# Patient Record
Sex: FEMALE | Race: WHITE | NOT HISPANIC OR LATINO | Employment: UNEMPLOYED | URBAN - METROPOLITAN AREA
[De-identification: names, ages, dates, MRNs, and addresses within clinical notes are randomized per-mention and may not be internally consistent; named-entity substitution may affect disease eponyms.]

---

## 2023-04-02 ENCOUNTER — APPOINTMENT (OUTPATIENT)
Dept: GENERAL RADIOLOGY | Facility: HOSPITAL | Age: 68
End: 2023-04-02
Payer: MEDICARE

## 2023-04-02 LAB
QT INTERVAL: 376 MS
QTC INTERVAL: 423 MS

## 2023-04-02 PROCEDURE — 93005 ELECTROCARDIOGRAM TRACING: CPT

## 2023-04-02 PROCEDURE — 36415 COLL VENOUS BLD VENIPUNCTURE: CPT

## 2023-04-02 PROCEDURE — 85025 COMPLETE CBC W/AUTO DIFF WBC: CPT | Performed by: EMERGENCY MEDICINE

## 2023-04-02 PROCEDURE — 85379 FIBRIN DEGRADATION QUANT: CPT | Performed by: INTERNAL MEDICINE

## 2023-04-02 PROCEDURE — 99284 EMERGENCY DEPT VISIT MOD MDM: CPT

## 2023-04-02 PROCEDURE — 84484 ASSAY OF TROPONIN QUANT: CPT | Performed by: EMERGENCY MEDICINE

## 2023-04-02 PROCEDURE — 83036 HEMOGLOBIN GLYCOSYLATED A1C: CPT | Performed by: INTERNAL MEDICINE

## 2023-04-02 PROCEDURE — 80053 COMPREHEN METABOLIC PANEL: CPT | Performed by: EMERGENCY MEDICINE

## 2023-04-02 PROCEDURE — 93005 ELECTROCARDIOGRAM TRACING: CPT | Performed by: EMERGENCY MEDICINE

## 2023-04-02 PROCEDURE — 83880 ASSAY OF NATRIURETIC PEPTIDE: CPT | Performed by: EMERGENCY MEDICINE

## 2023-04-02 PROCEDURE — 83690 ASSAY OF LIPASE: CPT | Performed by: EMERGENCY MEDICINE

## 2023-04-02 RX ORDER — ASPIRIN 81 MG/1
324 TABLET, CHEWABLE ORAL ONCE
Status: COMPLETED | OUTPATIENT
Start: 2023-04-02 | End: 2023-04-03

## 2023-04-02 RX ORDER — SODIUM CHLORIDE 0.9 % (FLUSH) 0.9 %
10 SYRINGE (ML) INJECTION AS NEEDED
Status: DISCONTINUED | OUTPATIENT
Start: 2023-04-02 | End: 2023-04-04 | Stop reason: HOSPADM

## 2023-04-03 ENCOUNTER — APPOINTMENT (OUTPATIENT)
Dept: GENERAL RADIOLOGY | Facility: HOSPITAL | Age: 68
End: 2023-04-03
Payer: MEDICARE

## 2023-04-03 ENCOUNTER — APPOINTMENT (OUTPATIENT)
Dept: CARDIOLOGY | Facility: HOSPITAL | Age: 68
End: 2023-04-03
Payer: MEDICARE

## 2023-04-03 ENCOUNTER — HOSPITAL ENCOUNTER (OUTPATIENT)
Facility: HOSPITAL | Age: 68
Setting detail: OBSERVATION
Discharge: HOME | End: 2023-04-04
Attending: EMERGENCY MEDICINE | Admitting: INTERNAL MEDICINE
Payer: MEDICARE

## 2023-04-03 DIAGNOSIS — I20.8 ANGINA AT REST: ICD-10-CM

## 2023-04-03 DIAGNOSIS — R07.9 CHEST PAIN, UNSPECIFIED TYPE: Primary | ICD-10-CM

## 2023-04-03 PROBLEM — Z82.49 FAMILY HISTORY OF ISCHEMIC HEART DISEASE BEFORE AGE 50: Status: ACTIVE | Noted: 2023-04-03

## 2023-04-03 PROBLEM — E11.9 TYPE 2 DIABETES MELLITUS, WITHOUT LONG-TERM CURRENT USE OF INSULIN: Status: ACTIVE | Noted: 2023-04-03

## 2023-04-03 PROBLEM — G20.A1 PARKINSON DISEASE: Status: ACTIVE | Noted: 2023-04-03

## 2023-04-03 PROBLEM — R55 PRE-SYNCOPE: Status: ACTIVE | Noted: 2023-04-03

## 2023-04-03 PROBLEM — G20 PARKINSON DISEASE: Status: ACTIVE | Noted: 2023-04-03

## 2023-04-03 PROBLEM — R07.89 CHEST PAIN, ATYPICAL: Status: ACTIVE | Noted: 2023-04-03

## 2023-04-03 PROBLEM — R73.03 PREDIABETES: Status: ACTIVE | Noted: 2023-04-03

## 2023-04-03 PROBLEM — E78.2 MIXED HYPERLIPIDEMIA: Status: ACTIVE | Noted: 2023-04-03

## 2023-04-03 LAB
ALBUMIN SERPL-MCNC: 4.6 G/DL (ref 3.5–5.2)
ALBUMIN/GLOB SERPL: 1.7 G/DL
ALP SERPL-CCNC: 49 U/L (ref 39–117)
ALT SERPL W P-5'-P-CCNC: 6 U/L (ref 1–33)
ANION GAP SERPL CALCULATED.3IONS-SCNC: 12 MMOL/L (ref 5–15)
ASCENDING AORTA: 3.3 CM
AST SERPL-CCNC: 18 U/L (ref 1–32)
BASOPHILS # BLD AUTO: 0.06 10*3/MM3 (ref 0–0.2)
BASOPHILS NFR BLD AUTO: 0.6 % (ref 0–1.5)
BH CV ECHO MEAS - AO MAX PG: 7.4 MMHG
BH CV ECHO MEAS - AO MEAN PG: 4 MMHG
BH CV ECHO MEAS - AO ROOT DIAM: 3 CM
BH CV ECHO MEAS - AO V2 MAX: 136 CM/SEC
BH CV ECHO MEAS - AO V2 VTI: 29.7 CM
BH CV ECHO MEAS - AVA(I,D): 2.09 CM2
BH CV ECHO MEAS - EDV(CUBED): 40.9 ML
BH CV ECHO MEAS - EDV(MOD-SP2): 85.5 ML
BH CV ECHO MEAS - EDV(MOD-SP4): 77.5 ML
BH CV ECHO MEAS - EF(MOD-BP): 66.3 %
BH CV ECHO MEAS - EF(MOD-SP2): 71.6 %
BH CV ECHO MEAS - EF(MOD-SP4): 59.6 %
BH CV ECHO MEAS - ESV(CUBED): 12.1 ML
BH CV ECHO MEAS - ESV(MOD-SP2): 24.3 ML
BH CV ECHO MEAS - ESV(MOD-SP4): 31.3 ML
BH CV ECHO MEAS - FS: 33.3 %
BH CV ECHO MEAS - IVS/LVPW: 0.78 CM
BH CV ECHO MEAS - IVSD: 0.77 CM
BH CV ECHO MEAS - LA DIMENSION: 2.8 CM
BH CV ECHO MEAS - LAT PEAK E' VEL: 10.2 CM/SEC
BH CV ECHO MEAS - LV MASS(C)D: 83.3 GRAMS
BH CV ECHO MEAS - LV MAX PG: 4.2 MMHG
BH CV ECHO MEAS - LV MEAN PG: 2 MMHG
BH CV ECHO MEAS - LV V1 MAX: 102 CM/SEC
BH CV ECHO MEAS - LV V1 VTI: 22.5 CM
BH CV ECHO MEAS - LVIDD: 3.4 CM
BH CV ECHO MEAS - LVIDS: 2.3 CM
BH CV ECHO MEAS - LVOT AREA: 2.8 CM2
BH CV ECHO MEAS - LVOT DIAM: 1.88 CM
BH CV ECHO MEAS - LVPWD: 0.98 CM
BH CV ECHO MEAS - MED PEAK E' VEL: 6.7 CM/SEC
BH CV ECHO MEAS - MV A MAX VEL: 87 CM/SEC
BH CV ECHO MEAS - MV DEC SLOPE: 305.6 CM/SEC2
BH CV ECHO MEAS - MV DEC TIME: 0.31 MSEC
BH CV ECHO MEAS - MV E MAX VEL: 76.3 CM/SEC
BH CV ECHO MEAS - MV E/A: 0.88
BH CV ECHO MEAS - MV MAX PG: 3.7 MMHG
BH CV ECHO MEAS - MV MEAN PG: 2.04 MMHG
BH CV ECHO MEAS - MV P1/2T: 85.9 MSEC
BH CV ECHO MEAS - MV V2 VTI: 24.6 CM
BH CV ECHO MEAS - MVA(P1/2T): 2.6 CM2
BH CV ECHO MEAS - MVA(VTI): 2.5 CM2
BH CV ECHO MEAS - PA ACC TIME: 0.17 SEC
BH CV ECHO MEAS - PA PR(ACCEL): 1.36 MMHG
BH CV ECHO MEAS - SV(LVOT): 62.2 ML
BH CV ECHO MEAS - SV(MOD-SP2): 61.2 ML
BH CV ECHO MEAS - SV(MOD-SP4): 46.2 ML
BH CV ECHO MEAS - TAPSE (>1.6): 3.2 CM
BH CV ECHO MEASUREMENTS AVERAGE E/E' RATIO: 9.03
BH CV REST NUCLEAR ISOTOPE DOSE: 29.9 MCI
BH CV STRESS BP STAGE 1: NORMAL
BH CV STRESS BP STAGE 3: NORMAL
BH CV STRESS COMMENTS STAGE 1: NORMAL
BH CV STRESS DOSE REGADENOSON STAGE 1: 0.4
BH CV STRESS DURATION MIN STAGE 1: 1
BH CV STRESS DURATION MIN STAGE 2: 1
BH CV STRESS DURATION MIN STAGE 3: 1
BH CV STRESS DURATION MIN STAGE 4: 1
BH CV STRESS DURATION SEC STAGE 1: 0
BH CV STRESS DURATION SEC STAGE 2: 0
BH CV STRESS DURATION SEC STAGE 3: 0
BH CV STRESS DURATION SEC STAGE 4: 0
BH CV STRESS HR STAGE 1: 101
BH CV STRESS HR STAGE 2: 102
BH CV STRESS HR STAGE 3: 101
BH CV STRESS HR STAGE 4: 101
BH CV STRESS NUCLEAR ISOTOPE DOSE: 29.9 MCI
BH CV STRESS O2 STAGE 1: 99
BH CV STRESS O2 STAGE 2: 100
BH CV STRESS O2 STAGE 3: 100
BH CV STRESS O2 STAGE 4: 100
BH CV STRESS PROTOCOL 1: NORMAL
BH CV STRESS RECOVERY BP: NORMAL MMHG
BH CV STRESS RECOVERY HR: 95 BPM
BH CV STRESS RECOVERY O2: 99 %
BH CV STRESS STAGE 1: 1
BH CV STRESS STAGE 2: 2
BH CV STRESS STAGE 3: 3
BH CV STRESS STAGE 4: 4
BH CV XLRA - RV BASE: 2.7 CM
BH CV XLRA - RV LENGTH: 6.3 CM
BH CV XLRA - RV MID: 1.99 CM
BH CV XLRA - TDI S': 12.3 CM/SEC
BILIRUB SERPL-MCNC: 0.2 MG/DL (ref 0–1.2)
BUN SERPL-MCNC: 14 MG/DL (ref 8–23)
BUN/CREAT SERPL: 13.7 (ref 7–25)
CALCIUM SPEC-SCNC: 9.8 MG/DL (ref 8.6–10.5)
CHLORIDE SERPL-SCNC: 105 MMOL/L (ref 98–107)
CHOLEST SERPL-MCNC: 148 MG/DL (ref 0–200)
CO2 SERPL-SCNC: 24 MMOL/L (ref 22–29)
CORTIS AM PEAK SERPL-MCNC: 19.99 MCG/DL
CREAT SERPL-MCNC: 1.02 MG/DL (ref 0.57–1)
D DIMER PPP FEU-MCNC: 0.32 MCGFEU/ML (ref 0–0.69)
DEPRECATED RDW RBC AUTO: 45.2 FL (ref 37–54)
EGFRCR SERPLBLD CKD-EPI 2021: 59.7 ML/MIN/1.73
EOSINOPHIL # BLD AUTO: 0.03 10*3/MM3 (ref 0–0.4)
EOSINOPHIL NFR BLD AUTO: 0.3 % (ref 0.3–6.2)
ERYTHROCYTE [DISTWIDTH] IN BLOOD BY AUTOMATED COUNT: 13.9 % (ref 12.3–15.4)
GEN 5 2HR TROPONIN T REFLEX: <6 NG/L
GLOBULIN UR ELPH-MCNC: 2.7 GM/DL
GLUCOSE BLDC GLUCOMTR-MCNC: 111 MG/DL (ref 70–130)
GLUCOSE BLDC GLUCOMTR-MCNC: 112 MG/DL (ref 70–130)
GLUCOSE BLDC GLUCOMTR-MCNC: 91 MG/DL (ref 70–130)
GLUCOSE BLDC GLUCOMTR-MCNC: 96 MG/DL (ref 70–130)
GLUCOSE SERPL-MCNC: 116 MG/DL (ref 65–99)
HBA1C MFR BLD: 6.1 % (ref 4.8–5.6)
HCT VFR BLD AUTO: 41.2 % (ref 34–46.6)
HDLC SERPL-MCNC: 61 MG/DL (ref 40–60)
HGB BLD-MCNC: 13.4 G/DL (ref 12–15.9)
HOLD SPECIMEN: NORMAL
IMM GRANULOCYTES # BLD AUTO: 0.03 10*3/MM3 (ref 0–0.05)
IMM GRANULOCYTES NFR BLD AUTO: 0.3 % (ref 0–0.5)
LDLC SERPL CALC-MCNC: 73 MG/DL (ref 0–100)
LDLC/HDLC SERPL: 1.18 {RATIO}
LEFT ATRIUM VOLUME INDEX: 17.1 ML/M2
LIPASE SERPL-CCNC: 51 U/L (ref 13–60)
LV EF NUC BP: 87 %
LYMPHOCYTES # BLD AUTO: 2.63 10*3/MM3 (ref 0.7–3.1)
LYMPHOCYTES NFR BLD AUTO: 28.3 % (ref 19.6–45.3)
MAXIMAL PREDICTED HEART RATE: 151 BPM
MAXIMAL PREDICTED HEART RATE: 151 BPM
MCH RBC QN AUTO: 29.1 PG (ref 26.6–33)
MCHC RBC AUTO-ENTMCNC: 32.5 G/DL (ref 31.5–35.7)
MCV RBC AUTO: 89.6 FL (ref 79–97)
MONOCYTES # BLD AUTO: 0.83 10*3/MM3 (ref 0.1–0.9)
MONOCYTES NFR BLD AUTO: 8.9 % (ref 5–12)
NEUTROPHILS NFR BLD AUTO: 5.71 10*3/MM3 (ref 1.7–7)
NEUTROPHILS NFR BLD AUTO: 61.6 % (ref 42.7–76)
NRBC BLD AUTO-RTO: 0 /100 WBC (ref 0–0.2)
NT-PROBNP SERPL-MCNC: 27 PG/ML (ref 0–900)
PERCENT MAX PREDICTED HR: 68.87 %
PLATELET # BLD AUTO: 259 10*3/MM3 (ref 140–450)
PMV BLD AUTO: 9.3 FL (ref 6–12)
POTASSIUM SERPL-SCNC: 3.9 MMOL/L (ref 3.5–5.2)
PROT SERPL-MCNC: 7.3 G/DL (ref 6–8.5)
QT INTERVAL: 364 MS
QT INTERVAL: 370 MS
QTC INTERVAL: 417 MS
QTC INTERVAL: 424 MS
RBC # BLD AUTO: 4.6 10*6/MM3 (ref 3.77–5.28)
SODIUM SERPL-SCNC: 141 MMOL/L (ref 136–145)
STRESS BASELINE BP: NORMAL MMHG
STRESS BASELINE HR: 79 BPM
STRESS O2 SAT REST: 100 %
STRESS PERCENT HR: 81 %
STRESS POST ESTIMATED WORKLOAD: 1 METS
STRESS POST EXERCISE DUR MIN: 4 MIN
STRESS POST EXERCISE DUR SEC: 0 SEC
STRESS POST O2 SAT PEAK: 100 %
STRESS POST PEAK BP: NORMAL MMHG
STRESS POST PEAK HR: 104 BPM
STRESS TARGET HR: 128 BPM
STRESS TARGET HR: 128 BPM
TRIGL SERPL-MCNC: 74 MG/DL (ref 0–150)
TROPONIN T DELTA: NORMAL
TROPONIN T SERPL HS-MCNC: <6 NG/L
TROPONIN T SERPL HS-MCNC: <6 NG/L
TSH SERPL DL<=0.05 MIU/L-ACNC: 2.44 UIU/ML (ref 0.27–4.2)
VLDLC SERPL-MCNC: 14 MG/DL (ref 5–40)
WBC NRBC COR # BLD: 9.29 10*3/MM3 (ref 3.4–10.8)
WHOLE BLOOD HOLD COAG: NORMAL
WHOLE BLOOD HOLD SPECIMEN: NORMAL

## 2023-04-03 PROCEDURE — G0378 HOSPITAL OBSERVATION PER HR: HCPCS

## 2023-04-03 PROCEDURE — 84484 ASSAY OF TROPONIN QUANT: CPT | Performed by: EMERGENCY MEDICINE

## 2023-04-03 PROCEDURE — 82962 GLUCOSE BLOOD TEST: CPT

## 2023-04-03 PROCEDURE — 96361 HYDRATE IV INFUSION ADD-ON: CPT

## 2023-04-03 PROCEDURE — 78431 MYOCRD IMG PET RST&STRS CT: CPT

## 2023-04-03 PROCEDURE — 93005 ELECTROCARDIOGRAM TRACING: CPT | Performed by: EMERGENCY MEDICINE

## 2023-04-03 PROCEDURE — 78431 MYOCRD IMG PET RST&STRS CT: CPT | Performed by: INTERNAL MEDICINE

## 2023-04-03 PROCEDURE — 93018 CV STRESS TEST I&R ONLY: CPT | Performed by: INTERNAL MEDICINE

## 2023-04-03 PROCEDURE — 80061 LIPID PANEL: CPT | Performed by: INTERNAL MEDICINE

## 2023-04-03 PROCEDURE — 93306 TTE W/DOPPLER COMPLETE: CPT

## 2023-04-03 PROCEDURE — 93017 CV STRESS TEST TRACING ONLY: CPT

## 2023-04-03 PROCEDURE — 82533 TOTAL CORTISOL: CPT | Performed by: INTERNAL MEDICINE

## 2023-04-03 PROCEDURE — 93306 TTE W/DOPPLER COMPLETE: CPT | Performed by: INTERNAL MEDICINE

## 2023-04-03 PROCEDURE — 99232 SBSQ HOSP IP/OBS MODERATE 35: CPT | Performed by: INTERNAL MEDICINE

## 2023-04-03 PROCEDURE — 99223 1ST HOSP IP/OBS HIGH 75: CPT | Performed by: INTERNAL MEDICINE

## 2023-04-03 PROCEDURE — 25010000002 REGADENOSON 0.4 MG/5ML SOLUTION: Performed by: INTERNAL MEDICINE

## 2023-04-03 PROCEDURE — 0 RUBIDIUM CHLORIDE: Performed by: INTERNAL MEDICINE

## 2023-04-03 PROCEDURE — 96360 HYDRATION IV INFUSION INIT: CPT

## 2023-04-03 PROCEDURE — 71045 X-RAY EXAM CHEST 1 VIEW: CPT

## 2023-04-03 PROCEDURE — A9555 RB82 RUBIDIUM: HCPCS | Performed by: INTERNAL MEDICINE

## 2023-04-03 PROCEDURE — 84443 ASSAY THYROID STIM HORMONE: CPT | Performed by: INTERNAL MEDICINE

## 2023-04-03 PROCEDURE — 84484 ASSAY OF TROPONIN QUANT: CPT | Performed by: INTERNAL MEDICINE

## 2023-04-03 RX ORDER — MIDODRINE HYDROCHLORIDE 5 MG/1
2.5 TABLET ORAL
Status: DISCONTINUED | OUTPATIENT
Start: 2023-04-03 | End: 2023-04-04

## 2023-04-03 RX ORDER — ACETAMINOPHEN 650 MG/1
650 SUPPOSITORY RECTAL EVERY 4 HOURS PRN
Status: DISCONTINUED | OUTPATIENT
Start: 2023-04-03 | End: 2023-04-04 | Stop reason: HOSPADM

## 2023-04-03 RX ORDER — SODIUM CHLORIDE 0.9 % (FLUSH) 0.9 %
10 SYRINGE (ML) INJECTION AS NEEDED
Status: DISCONTINUED | OUTPATIENT
Start: 2023-04-03 | End: 2023-04-04 | Stop reason: HOSPADM

## 2023-04-03 RX ORDER — SODIUM CHLORIDE 1000 MG
1 TABLET, SOLUBLE MISCELLANEOUS DAILY
COMMUNITY

## 2023-04-03 RX ORDER — SIMVASTATIN 20 MG
20 TABLET ORAL NIGHTLY
COMMUNITY

## 2023-04-03 RX ORDER — MULTIPLE VITAMINS W/ MINERALS TAB 9MG-400MCG
1 TAB ORAL DAILY
COMMUNITY

## 2023-04-03 RX ORDER — ONDANSETRON 2 MG/ML
4 INJECTION INTRAMUSCULAR; INTRAVENOUS EVERY 6 HOURS PRN
Status: DISCONTINUED | OUTPATIENT
Start: 2023-04-03 | End: 2023-04-04 | Stop reason: HOSPADM

## 2023-04-03 RX ORDER — CARBIDOPA AND LEVODOPA 50; 200 MG/1; MG/1
1 TABLET, EXTENDED RELEASE ORAL EVERY 12 HOURS SCHEDULED
Status: DISCONTINUED | OUTPATIENT
Start: 2023-04-03 | End: 2023-04-04 | Stop reason: HOSPADM

## 2023-04-03 RX ORDER — BISACODYL 5 MG/1
5 TABLET, DELAYED RELEASE ORAL DAILY PRN
Status: DISCONTINUED | OUTPATIENT
Start: 2023-04-03 | End: 2023-04-04 | Stop reason: HOSPADM

## 2023-04-03 RX ORDER — CAFFEINE CITRATE 20 MG/ML
60 SOLUTION INTRAVENOUS ONCE
Status: COMPLETED | OUTPATIENT
Start: 2023-04-03 | End: 2023-04-03

## 2023-04-03 RX ORDER — SODIUM CHLORIDE, SODIUM LACTATE, POTASSIUM CHLORIDE, CALCIUM CHLORIDE 600; 310; 30; 20 MG/100ML; MG/100ML; MG/100ML; MG/100ML
75 INJECTION, SOLUTION INTRAVENOUS CONTINUOUS
Status: ACTIVE | OUTPATIENT
Start: 2023-04-03 | End: 2023-04-03

## 2023-04-03 RX ORDER — ATORVASTATIN CALCIUM 40 MG/1
80 TABLET, FILM COATED ORAL NIGHTLY
Status: DISCONTINUED | OUTPATIENT
Start: 2023-04-03 | End: 2023-04-03

## 2023-04-03 RX ORDER — MIDODRINE HYDROCHLORIDE 5 MG/1
10 TABLET ORAL 3 TIMES DAILY
COMMUNITY

## 2023-04-03 RX ORDER — SODIUM CHLORIDE 0.9 % (FLUSH) 0.9 %
10 SYRINGE (ML) INJECTION EVERY 12 HOURS SCHEDULED
Status: DISCONTINUED | OUTPATIENT
Start: 2023-04-03 | End: 2023-04-04 | Stop reason: HOSPADM

## 2023-04-03 RX ORDER — SODIUM CHLORIDE 9 MG/ML
40 INJECTION, SOLUTION INTRAVENOUS AS NEEDED
Status: DISCONTINUED | OUTPATIENT
Start: 2023-04-03 | End: 2023-04-04 | Stop reason: HOSPADM

## 2023-04-03 RX ORDER — BISACODYL 10 MG
10 SUPPOSITORY, RECTAL RECTAL DAILY PRN
Status: DISCONTINUED | OUTPATIENT
Start: 2023-04-03 | End: 2023-04-04 | Stop reason: HOSPADM

## 2023-04-03 RX ORDER — POLYETHYLENE GLYCOL 3350 17 G/17G
17 POWDER, FOR SOLUTION ORAL DAILY PRN
Status: DISCONTINUED | OUTPATIENT
Start: 2023-04-03 | End: 2023-04-04 | Stop reason: HOSPADM

## 2023-04-03 RX ORDER — ACETAMINOPHEN 160 MG/5ML
650 SOLUTION ORAL EVERY 4 HOURS PRN
Status: DISCONTINUED | OUTPATIENT
Start: 2023-04-03 | End: 2023-04-04 | Stop reason: HOSPADM

## 2023-04-03 RX ORDER — ACETAMINOPHEN 325 MG/1
650 TABLET ORAL EVERY 4 HOURS PRN
Status: DISCONTINUED | OUTPATIENT
Start: 2023-04-03 | End: 2023-04-04 | Stop reason: HOSPADM

## 2023-04-03 RX ORDER — AMOXICILLIN 250 MG
2 CAPSULE ORAL 2 TIMES DAILY
Status: DISCONTINUED | OUTPATIENT
Start: 2023-04-03 | End: 2023-04-04 | Stop reason: HOSPADM

## 2023-04-03 RX ORDER — ATORVASTATIN CALCIUM 40 MG/1
80 TABLET, FILM COATED ORAL NIGHTLY
Status: DISCONTINUED | OUTPATIENT
Start: 2023-04-03 | End: 2023-04-04 | Stop reason: HOSPADM

## 2023-04-03 RX ADMIN — CARBIDOPA AND LEVODOPA 1 TABLET: 50; 200 TABLET, EXTENDED RELEASE ORAL at 10:01

## 2023-04-03 RX ADMIN — CARBIDOPA AND LEVODOPA 1 TABLET: 50; 200 TABLET, EXTENDED RELEASE ORAL at 20:38

## 2023-04-03 RX ADMIN — CAFFEINE CITRATE 60 MG: 20 INJECTION, SOLUTION INTRAVENOUS at 08:55

## 2023-04-03 RX ADMIN — ASPIRIN 81 MG CHEWABLE TABLET 324 MG: 81 TABLET CHEWABLE at 01:55

## 2023-04-03 RX ADMIN — SODIUM CHLORIDE, POTASSIUM CHLORIDE, SODIUM LACTATE AND CALCIUM CHLORIDE 75 ML/HR: 600; 310; 30; 20 INJECTION, SOLUTION INTRAVENOUS at 05:46

## 2023-04-03 RX ADMIN — Medication 10 ML: at 20:39

## 2023-04-03 RX ADMIN — RUBIDIUM CHLORIDE RB-82 1 DOSE: 150 INJECTION, SOLUTION INTRAVENOUS at 08:42

## 2023-04-03 RX ADMIN — RUBIDIUM CHLORIDE RB-82 1 DOSE: 150 INJECTION, SOLUTION INTRAVENOUS at 08:31

## 2023-04-03 RX ADMIN — REGADENOSON 0.4 MG: 0.08 INJECTION, SOLUTION INTRAVENOUS at 08:43

## 2023-04-03 RX ADMIN — MIDODRINE HYDROCHLORIDE 2.5 MG: 5 TABLET ORAL at 18:36

## 2023-04-03 RX ADMIN — SENNOSIDES AND DOCUSATE SODIUM 2 TABLET: 50; 8.6 TABLET ORAL at 10:01

## 2023-04-03 RX ADMIN — ATORVASTATIN CALCIUM 80 MG: 40 TABLET, FILM COATED ORAL at 05:46

## 2023-04-03 RX ADMIN — ATORVASTATIN CALCIUM 80 MG: 40 TABLET, FILM COATED ORAL at 20:42

## 2023-04-03 NOTE — ED PROVIDER NOTES
EMERGENCY DEPARTMENT ENCOUNTER    Pt Name: Kim Osorio  MRN: [id number]  Pt :   1955  Room Number:  N605/1  Date of encounter:  2023  PCP: Josefa Lerma MD  ED Provider: David Hancock MD    Historian: patient      HPI:  Chief Complaint: chest pain         Context: Kim Osorio is a 67 y.o. female who presents to the ED c/o chest pain, initially occurring at 3 AM, approximately 18 hours ago, lasting a few minutes with some jaw pain.  Did have another episode around 8 AM, approximately 13 hours ago.  Past medical history of orthostatic or low blood pressure, and has followed with neurology with prescription medications, midodrine, for this which is new, relating this concern in context of her chest pain,, as well as Sinemet.  She does not have a history of prior heart disease, or angina, the chest pain has been recurrent over the prior day causing her to present to the emergency room and is left-sided, rating to her jaw or neck but intermittent.      PAST MEDICAL HISTORY  Past Medical History:   Diagnosis Date   • Family history of early CAD    • Glaucoma    • HLD (hyperlipidemia)    • Macular degeneration    • Parkinson disease          PAST SURGICAL HISTORY  Past Surgical History:   Procedure Laterality Date   • BREAST BIOPSY     • GUM SURGERY     • POLYPECTOMY     • WISDOM TOOTH EXTRACTION           FAMILY HISTORY  Family History   Problem Relation Age of Onset   • Heart disease Father    • Heart disease Brother          SOCIAL HISTORY  Social History     Socioeconomic History   • Marital status:    Tobacco Use   • Smoking status: Never   • Smokeless tobacco: Never   Vaping Use   • Vaping Use: Never used   Substance and Sexual Activity   • Alcohol use: Yes     Comment: rarely   • Drug use: Never         ALLERGIES  Patient has no known allergies.        REVIEW OF SYSTEMS  Review of Systems       All systems reviewed and negative except for those discussed in HPI.        PHYSICAL EXAM    I have reviewed the triage vital signs and nursing notes.    ED Triage Vitals [04/02/23 2333]   Temp Heart Rate Resp BP SpO2   98.4 °F (36.9 °C) 76 18 137/58 100 %      Temp src Heart Rate Source Patient Position BP Location FiO2 (%)   Oral Monitor Sitting Left arm --       Physical Exam  GENERAL:   Appears in no acute distress.   HENT: Nares patent.  EYES: No scleral icterus.  CV: Regular rhythm, regular rate.  RESPIRATORY: Normal effort.  No audible wheezes, rales or rhonchi.  ABDOMEN: Soft, nontender  MUSCULOSKELETAL: No deformities.   NEURO: Alert, moves all extremities, follows commands.  SKIN: Warm, dry, no rash visualized.      LAB RESULTS  Recent Results (from the past 24 hour(s))   ECG 12 Lead ED Triage Standing Order; Chest Pain    Collection Time: 04/02/23 11:37 PM   Result Value Ref Range    QT Interval 376 ms    QTC Interval 423 ms   High Sensitivity Troponin T    Collection Time: 04/02/23 11:54 PM    Specimen: Blood   Result Value Ref Range    HS Troponin T <6 <10 ng/L   Comprehensive Metabolic Panel    Collection Time: 04/02/23 11:54 PM    Specimen: Blood   Result Value Ref Range    Glucose 116 (H) 65 - 99 mg/dL    BUN 14 8 - 23 mg/dL    Creatinine 1.02 (H) 0.57 - 1.00 mg/dL    Sodium 141 136 - 145 mmol/L    Potassium 3.9 3.5 - 5.2 mmol/L    Chloride 105 98 - 107 mmol/L    CO2 24.0 22.0 - 29.0 mmol/L    Calcium 9.8 8.6 - 10.5 mg/dL    Total Protein 7.3 6.0 - 8.5 g/dL    Albumin 4.6 3.5 - 5.2 g/dL    ALT (SGPT) 6 1 - 33 U/L    AST (SGOT) 18 1 - 32 U/L    Alkaline Phosphatase 49 39 - 117 U/L    Total Bilirubin 0.2 0.0 - 1.2 mg/dL    Globulin 2.7 gm/dL    A/G Ratio 1.7 g/dL    BUN/Creatinine Ratio 13.7 7.0 - 25.0    Anion Gap 12.0 5.0 - 15.0 mmol/L    eGFR 59.7 (L) >60.0 mL/min/1.73   Lipase    Collection Time: 04/02/23 11:54 PM    Specimen: Blood   Result Value Ref Range    Lipase 51 13 - 60 U/L   BNP    Collection Time: 04/02/23 11:54 PM    Specimen: Blood   Result Value Ref  Range    proBNP 27.0 0.0 - 900.0 pg/mL   Green Top (Gel)    Collection Time: 04/02/23 11:54 PM   Result Value Ref Range    Extra Tube Hold for add-ons.    Lavender Top    Collection Time: 04/02/23 11:54 PM   Result Value Ref Range    Extra Tube hold for add-on    Gold Top - SST    Collection Time: 04/02/23 11:54 PM   Result Value Ref Range    Extra Tube Hold for add-ons.    Gray Top    Collection Time: 04/02/23 11:54 PM   Result Value Ref Range    Extra Tube Hold for add-ons.    Light Blue Top    Collection Time: 04/02/23 11:54 PM   Result Value Ref Range    Extra Tube Hold for add-ons.    CBC Auto Differential    Collection Time: 04/02/23 11:54 PM    Specimen: Blood   Result Value Ref Range    WBC 9.29 3.40 - 10.80 10*3/mm3    RBC 4.60 3.77 - 5.28 10*6/mm3    Hemoglobin 13.4 12.0 - 15.9 g/dL    Hematocrit 41.2 34.0 - 46.6 %    MCV 89.6 79.0 - 97.0 fL    MCH 29.1 26.6 - 33.0 pg    MCHC 32.5 31.5 - 35.7 g/dL    RDW 13.9 12.3 - 15.4 %    RDW-SD 45.2 37.0 - 54.0 fl    MPV 9.3 6.0 - 12.0 fL    Platelets 259 140 - 450 10*3/mm3    Neutrophil % 61.6 42.7 - 76.0 %    Lymphocyte % 28.3 19.6 - 45.3 %    Monocyte % 8.9 5.0 - 12.0 %    Eosinophil % 0.3 0.3 - 6.2 %    Basophil % 0.6 0.0 - 1.5 %    Immature Grans % 0.3 0.0 - 0.5 %    Neutrophils, Absolute 5.71 1.70 - 7.00 10*3/mm3    Lymphocytes, Absolute 2.63 0.70 - 3.10 10*3/mm3    Monocytes, Absolute 0.83 0.10 - 0.90 10*3/mm3    Eosinophils, Absolute 0.03 0.00 - 0.40 10*3/mm3    Basophils, Absolute 0.06 0.00 - 0.20 10*3/mm3    Immature Grans, Absolute 0.03 0.00 - 0.05 10*3/mm3    nRBC 0.0 0.0 - 0.2 /100 WBC   D-dimer, Quantitative    Collection Time: 04/02/23 11:54 PM    Specimen: Blood   Result Value Ref Range    D-Dimer, Quantitative 0.32 0.00 - 0.69 MCGFEU/mL   Hemoglobin A1c    Collection Time: 04/02/23 11:54 PM    Specimen: Blood   Result Value Ref Range    Hemoglobin A1C 6.10 (H) 4.80 - 5.60 %   ECG 12 Lead ED Triage Standing Order; Chest Pain    Collection Time:  04/03/23  1:57 AM   Result Value Ref Range    QT Interval 370 ms    QTC Interval 424 ms   High Sensitivity Troponin T 2Hr    Collection Time: 04/03/23  2:00 AM    Specimen: Blood   Result Value Ref Range    HS Troponin T <6 <10 ng/L    Troponin T Delta     TSH    Collection Time: 04/03/23  2:00 AM    Specimen: Blood   Result Value Ref Range    TSH 2.440 0.270 - 4.200 uIU/mL   Lipid Panel    Collection Time: 04/03/23  2:00 AM    Specimen: Blood   Result Value Ref Range    Total Cholesterol 148 0 - 200 mg/dL    Triglycerides 74 0 - 150 mg/dL    HDL Cholesterol 61 (H) 40 - 60 mg/dL    LDL Cholesterol  73 0 - 100 mg/dL    VLDL Cholesterol 14 5 - 40 mg/dL    LDL/HDL Ratio 1.18    ECG 12 Lead Chest Pain    Collection Time: 04/03/23  2:51 AM   Result Value Ref Range    QT Interval 364 ms    QTC Interval 417 ms   POC Glucose Once    Collection Time: 04/03/23  5:26 AM    Specimen: Blood   Result Value Ref Range    Glucose 112 70 - 130 mg/dL   POC Glucose Once    Collection Time: 04/03/23  7:46 AM    Specimen: Blood   Result Value Ref Range    Glucose 91 70 - 130 mg/dL   Stress Test With Pet Myocardial Perfusion    Collection Time: 04/03/23  8:37 AM   Result Value Ref Range    BH CV STRESS PROTOCOL 1 Pharmacologic     Stage 1 1     Duration Min Stage 1 1     Duration Sec Stage 1 0     Stress Dose Regadenoson Stage 1 0.4     Stress Comments Stage 1 10 sec bolus injection     Stage 2 2     Duration Min Stage 2 1     Duration Sec Stage 2 0     Stage 3 3     Duration Min Stage 3 1     Duration Sec Stage 3 0     Stage 4 4     Duration Min Stage 4 1     Duration Sec Stage 4 0     Target HR (85%) 128 bpm    Max. Pred. HR (100%) 151 bpm    Exercise duration (min) 4 min    Exercise duration (sec) 0 sec    Estimated workload 1.0 METS    Baseline HR 79 bpm    Baseline /56 mmHg    O2 sat rest 100 %       If labs were ordered, I independently reviewed the results and considered them in treating the patient.        RADIOLOGY  XR  Chest 1 View    Result Date: 4/3/2023  EXAMINATION: XR CHEST 1 VW  DATE OF EXAM: 4/3/2023 12:37 AM HISTORY: Chest Pain Triage Protocol COMPARISON: None. FINDINGS: The lungs are clear. The cardiomediastinal silhouette, bones, soft tissues and upper abdomen are normal.     1.  Normal chest Electronically signed by:  Ignacio Rivera M.D.  4/2/2023 10:42 PM Mountain Time        PROCEDURES    Procedures    ECG 12 Lead Chest Pain   Final Result   Test Reason : Chest Pain   Blood Pressure :   */*   mmHG   Vent. Rate :  79 BPM     Atrial Rate :  79 BPM      P-R Int : 164 ms          QRS Dur :  74 ms       QT Int : 364 ms       P-R-T Axes :  26 -27  59 degrees      QTc Int : 417 ms      Normal sinus rhythm   Low voltage QRS   Cannot rule out Anterior infarct (cited on or before 02-APR-2023)   Abnormal ECG   When compared with ECG of 03-APR-2023 01:57, (Unconfirmed)   No significant change was found   Confirmed by FRANK EVANGELISTA ([id number]) on 4/3/2023 3:20:30 AM      Referred By: ERMVERONICA           Confirmed By: FRANK EVANGELISTA      ECG 12 Lead ED Triage Standing Order; Chest Pain   Final Result   Test Reason : ED Triage Standing Order~   Blood Pressure :   */*   mmHG   Vent. Rate :  79 BPM     Atrial Rate :  79 BPM      P-R Int : 162 ms          QRS Dur :  78 ms       QT Int : 370 ms       P-R-T Axes :  59 -17  62 degrees      QTc Int : 424 ms      Normal sinus rhythm   Low voltage QRS   Cannot rule out Anterior infarct (cited on or before 02-APR-2023)   Abnormal ECG   When compared with ECG of 02-APR-2023 23:37,   No significant change was found   Confirmed by FRANK EVANGELISTA ([id number]) on 4/3/2023 6:53:38 AM      Referred By: EDMD           Confirmed By: FRANK EVANGELISTA      ECG 12 Lead ED Triage Standing Order; Chest Pain   Final Result   Test Reason : ED Triage Standing Order~   Blood Pressure :   */*   mmHG   Vent. Rate :  76 BPM     Atrial Rate :  76 BPM      P-R Int : 162 ms          QRS Dur :  80 ms       QT Int : 376  ms       P-R-T Axes :  79 -17  59 degrees      QTc Int : 423 ms      Normal sinus rhythm   Cannot rule out Anterior infarct , age undetermined   Abnormal ECG   No previous ECGs available   Confirmed by FRANK EVANGELISTA ([id number]) on 4/2/2023 11:44:41 PM      Referred By: NASRIN           Confirmed By: FRANK EVANGELISTA          MEDICATIONS GIVEN IN ER    Medications   sodium chloride 0.9 % flush 10 mL (has no administration in time range)   lactated ringers infusion (75 mL/hr Intravenous New Bag 4/3/23 0546)   atorvastatin (LIPITOR) tablet 80 mg (80 mg Oral Given 4/3/23 0546)   carbidopa-levodopa CR (SINEMET CR)  MG per CR tablet 1 tablet (has no administration in time range)   sodium chloride 0.9 % flush 10 mL (has no administration in time range)   sodium chloride 0.9 % flush 10 mL (has no administration in time range)   sodium chloride 0.9 % infusion 40 mL (has no administration in time range)   acetaminophen (TYLENOL) tablet 650 mg (has no administration in time range)     Or   acetaminophen (TYLENOL) 160 MG/5ML solution 650 mg (has no administration in time range)     Or   acetaminophen (TYLENOL) suppository 650 mg (has no administration in time range)   sennosides-docusate (PERICOLACE) 8.6-50 MG per tablet 2 tablet (has no administration in time range)     And   polyethylene glycol (MIRALAX) packet 17 g (has no administration in time range)     And   bisacodyl (DULCOLAX) EC tablet 5 mg (has no administration in time range)     And   bisacodyl (DULCOLAX) suppository 10 mg (has no administration in time range)   ondansetron (ZOFRAN) injection 4 mg (has no administration in time range)   caffeine citrated (CAFCIT) injection 60 mg (has no administration in time range)   aspirin chewable tablet 324 mg (324 mg Oral Given 4/3/23 3671)   regadenoson (LEXISCAN) injection 0.4 mg (0.4 mg Intravenous Given 4/3/23 9636)         MEDICAL DECISION MAKING, PROGRESS, and CONSULTS    All labs have been independently reviewed by  me.  All radiology studies have been reviewed by me and the radiologist dictating the report.  All EKG's have been independently viewed and interpreted by me.      Discussion below represents my analysis of pertinent findings related to patient's condition, differential diagnosis, treatment plan and final disposition.      Differential diagnosis:    Angina, new onset or unstable angina, noncardiac chest pain, medication side effects      Orders placed during this visit:  Orders Placed This Encounter   Procedures   • XR Chest 1 View   • [place] Draw   • High Sensitivity Troponin T   • Comprehensive Metabolic Panel   • Lipase   • BNP   • CBC Auto Differential   • High Sensitivity Troponin T 2Hr   • D-dimer, Quantitative   • Cortisol - AM   • Hemoglobin A1c   • TSH   • Lipid Panel   • High Sensitivity Troponin T   • Basic Metabolic Panel   • NPO Diet NPO Type: Sips with Meds   • Undress & Gown   • Orthostatic Blood Pressure   • Please enter all of patients medications and message me when ready for review.  Nursing Communication   • Vital Signs   • Intake & Output   • Weigh Patient   • Oral Care   • Saline Lock & Maintain IV Access   • Place Sequential Compression Device   • Maintain Sequential Compression Device   • Pulse Oximetry, Continuous   • Cardiac Monitoring   • Code Status and Medical Interventions:   • Inpatient Cardiology Consult   • Oxygen Therapy- Nasal Cannula; Titrate for SPO2: 90% - 95%   • Incentive Spirometry   • Stress Test With Pet Myocardial Perfusion   • POC Glucose Finger Q6H   • POC Glucose Once   • POC Glucose Once   • ECG 12 Lead ED Triage Standing Order; Chest Pain   • ECG 12 Lead ED Triage Standing Order; Chest Pain   • ECG 12 Lead Chest Pain   • Adult Transthoracic Echo Complete W/ Cont if Necessary Per Protocol   • Insert Peripheral IV   • Insert Peripheral IV   • Initiate Observation Status   • ED Bed Request   • Fall Precautions   • CBC & Differential   • Green Top (Gel)   • Lavender Top    • Gold Top - SST   • Gray Top   • Light Blue Top   • CBC & Differential           ED Course:    Consultants: Hospitalist service    ED Course as of 04/03/23 0850   Sun Apr 02, 2023   2343 s [TA]      ED Course User Index  [TA] David Hancock MD                  AS OF 08:50 EDT VITALS:    BP - 137/87  HR - 72  TEMP - 97.8 °F (36.6 °C) (Oral)  O2 SATS - 100%                  DIAGNOSIS  Final diagnoses:   Chest pain, unspecified type   Angina at rest         DISPOSITION  admit      Please note that portions of this document were completed with voice recognition software.      David Hancock MD  04/03/23 3745

## 2023-04-03 NOTE — H&P
[facility] Medicine Services  HISTORY AND PHYSICAL    Patient Name: Kim Osorio  : 1955  MRN: [id number]  Primary Care Physician: Josefa Lerma MD  Date of admission: 4/3/2023      Subjective   Subjective     Chief Complaint:  Chest pain    HPI:  Kim Osorio is a 67 y.o. female with PMH of glaucoma, hyperlipidemia, macular degeneration, glaucoma, recently diagnosed Parkinson disease, recurrent presyncopal episodes thought to be related to possible orthostatic hypotension, prediabetes, and family history of coronary artery disease who presents to the ER with complaints of chest pain.    Patient states that she has been struggling with intermittent presyncopal episodes since fall of this past year.  She states that it has progressively worsened and finally sought medical opinion from Dr. Joyce Fischer in February.  She had ultrasound of the carotids bilaterally that was negative.  She was placed on midodrine for presumed orthostatic hypotension.  When asked about the events, patient states that it can happen when she goes from a seated to standing position, however other times it can happen while just sitting on the couch.  She reports tunnel vision prior to the episodes and feels as if she is about to pass out.  Denies any full syncope.  Denies having any chest pain, nausea, or shortness of air during these episodes.  Patient reports she has been on midodrine for approximately 1 month.  She was referred to cardiology at [facility] for further evaluation.  Patient reports over the past 2 weeks the symptoms have been progressively worsening    Patient states that on 2023 at approximately 3 AM in the morning she was awake and sitting when she suddenly had a pressure-like sensation in the center of her chest. She felt as if her heart was pounding and beating very fast. She could hear her heart beat in her ears. She became extremely nauseated.  Denies any  abdominal pain with this.  Denies any GERD.  She states that it was midsternal, lasting 5 minutes in duration, and radiating to her jaw.  She states the jaw discomfort lasted for approximately 45 minutes.  Denies any further radiation.  She has had 4 subsequent episodes over the past 24 hours with similar symptoms.  Denies any diaphoresis, shortness of air.  Patient is concerned as she has a brother who had a heart attack at around age 50 and her father  at around 60 with undiagnosed coronary artery disease      Review of Systems   Gen- No fevers, chills  CV- reports chest pain, reports tachycardia  Resp- No cough, dyspnea  GI- reports nausea, denies abd pain        Personal History     Past Medical History:   Diagnosis Date   • Family history of early CAD    • Glaucoma    • HLD (hyperlipidemia)    • Macular degeneration    • Parkinson disease              Past Surgical History:   Procedure Laterality Date   • BREAST BIOPSY     • GUM SURGERY     • POLYPECTOMY     • WISDOM TOOTH EXTRACTION         Family History: family history includes Heart disease in her brother and father.     Social History:  reports that she has never smoked. She does not have any smokeless tobacco history on file. She reports current alcohol use. She reports that she does not use drugs.  Social History     Social History Narrative   • Not on file       Medications:  Available home medication information reviewed.  (Not in a hospital admission)      No Known Allergies    Objective   Objective     Vital Signs:   Temp:  [98.4 °F (36.9 °C)] 98.4 °F (36.9 °C)  Heart Rate:  [76] 76  Resp:  [18] 18  BP: (137)/(58) 137/58       Physical Exam   Constitutional: Awake, alert, nontoxic  Eyes: PERRLA, sclerae anicteric, no conjunctival injection  HENT: NCAT, mucous membranes moist  Neck: Supple, no thyromegaly, no lymphadenopathy, trachea midline  Respiratory: Clear to auscultation bilaterally, nonlabored respirations   Cardiovascular: RRR, no murmurs,  rubs, or gallops, palpable pedal pulses bilaterally  Gastrointestinal: Positive bowel sounds, soft, nontender, nondistended  Musculoskeletal: No bilateral ankle edema, no clubbing or cyanosis to extremities  Psychiatric: Appropriate affect, cooperative  Neurologic: Oriented x 3, strength symmetric in all extremities, Cranial Nerves grossly intact to confrontation, speech clear  Skin: No rashes        Result Review:  I have personally reviewed the results from the time of this admission to 4/3/2023 04:20 EDT and agree with these findings:  [x]  Laboratory list / accordion  []  Microbiology  [x]  Radiology  []  EKG/Telemetry   []  Cardiology/Vascular   []  Pathology  []  Old records  []  Other:  Most notable findings include: cxr negative, trop negative x 2, ekg with twave flattening in avl but otherwise unremarkable      LAB RESULTS:      Lab 04/02/23  2354   WBC 9.29   HEMOGLOBIN 13.4   HEMATOCRIT 41.2   PLATELETS 259   NEUTROS ABS 5.71   IMMATURE GRANS (ABS) 0.03   LYMPHS ABS 2.63   MONOS ABS 0.83   EOS ABS 0.03   MCV 89.6   D DIMER QUANT 0.32         Lab 04/02/23  2354   SODIUM 141   POTASSIUM 3.9   CHLORIDE 105   CO2 24.0   ANION GAP 12.0   BUN 14   CREATININE 1.02*   EGFR 59.7*   GLUCOSE 116*   CALCIUM 9.8         Lab 04/02/23  2354   TOTAL PROTEIN 7.3   ALBUMIN 4.6   GLOBULIN 2.7   ALT (SGPT) 6   AST (SGOT) 18   BILIRUBIN 0.2   ALK PHOS 49   LIPASE 51         Lab 04/03/23  0200 04/02/23  2354   PROBNP  --  27.0   HSTROP T <6 <6                     Microbiology Results (last 10 days)     ** No results found for the last 240 hours. **          XR Chest 1 View    Result Date: 4/3/2023  EXAMINATION: XR CHEST 1 VW  DATE OF EXAM: 4/3/2023 12:37 AM HISTORY: Chest Pain Triage Protocol COMPARISON: None. FINDINGS: The lungs are clear. The cardiomediastinal silhouette, bones, soft tissues and upper abdomen are normal.     Impression: 1.  Normal chest Electronically signed by:  Ignacio Rivera M.D.  4/2/2023 10:42 PM  [place]ountain Time          Assessment & Plan   Assessment & Plan     Active Hospital Problems    Diagnosis  POA   • **Chest pain, unspecified type [R07.9]  Yes   • Parkinson disease [G20]  Unknown   • Mixed hyperlipidemia [E78.2]  Unknown   • Family history of ischemic heart disease around age 50 in her brother [Z82.49]  Not Applicable   • Prediabetes [R73.03]  Unknown       Patient is a 67 F with PMH of glaucoma, hyperlipidemia, macular degeneration, glaucoma, recently diagnosed Parkinson disease, recurrent presyncopal episodes thought to be related to possible orthostatic hypotension, prediabetes, and family history of coronary artery disease who presents to the ER with complaints of chest pain.    Chest pain/rule out Unstable angina  --aspirin 324 mg, continue 81 mg daily  --statin  --BB  --Stress test ordered, cardiology eval given multiple risk factor  --trend ekg and troponins  --lipid panel, a1c, tsh  -- Echo  -- Check orthostatic vitals  --d-dimer pending  --? Adverse reaction to midodrine. Hold for now    Recurrent presyncopal episodes  --? orthostasis vs arrhythmia vs valvular dysfunction   --echo  --orthostatics    HLD  --lipid panel  --atorvastatin    Parkinson disease  --continue sinemet    Prediabetes  --check a1c  --accuchecks      Total time spent: 75  Time spent includes time reviewing chart, face-to-face time, counseling patient/family/caregiver, ordering medications/tests/procedures, communicating with other health care professionals, documenting clinical information in the electronic health record, and coordination of care.      DVT prophylaxis:  lovenox      CODE STATUS:  Full code  There are no questions and answers to display.       Expected Discharge   Expected Discharge Date and Time     Expected Discharge Date Expected Discharge Time    Apr 4, 2023            Td Ibarra DO  04/03/23

## 2023-04-03 NOTE — PROGRESS NOTES
[facility] Medicine Services  PROGRESS NOTE    Patient Name: Kim sOorio  : 1955  MRN: [id number]    Date of Admission: 4/3/2023  Primary Care Physician: Josefa Lerma MD    Subjective   Subjective     CC:  Chest pain    HPI:  No current chest pain    ROS:  Gen: no fever  Pulm: no cough  CV: as above    Objective   Objective     Vital Signs:   Temp:  [97.8 °F (36.6 °C)-98.4 °F (36.9 °C)] 97.8 °F (36.6 °C)  Heart Rate:  [] 94  Resp:  [16-18] 18  BP: ()/() 97/64     Physical Exam:  Constitutional - no acute distress, nontoxic, lying in bed  HEENT-NCAT, mucous membranes moist  CV-RRR  Resp-CTAB  Abd-soft, nontender, nondistended, normoactive bowel sounds  Ext-No lower extremity cyanosis, clubbing or edema bilaterally  Neuro-alert, speech clear, moves all extremities   Psych-normal affect   Skin- No rash on exposed UE or LE bilaterally      Results Reviewed:  LAB RESULTS:      Lab 23  2354   WBC 9.29   HEMOGLOBIN 13.4   HEMATOCRIT 41.2   PLATELETS 259   NEUTROS ABS 5.71   IMMATURE GRANS (ABS) 0.03   LYMPHS ABS 2.63   MONOS ABS 0.83   EOS ABS 0.03   MCV 89.6   D DIMER QUANT 0.32         Lab 23  0200 23  2354   SODIUM  --  141   POTASSIUM  --  3.9   CHLORIDE  --  105   CO2  --  24.0   ANION GAP  --  12.0   BUN  --  14   CREATININE  --  1.02*   EGFR  --  59.7*   GLUCOSE  --  116*   CALCIUM  --  9.8   HEMOGLOBIN A1C  --  6.10*   TSH 2.440  --          Lab 23  2354   TOTAL PROTEIN 7.3   ALBUMIN 4.6   GLOBULIN 2.7   ALT (SGPT) 6   AST (SGOT) 18   BILIRUBIN 0.2   ALK PHOS 49   LIPASE 51         Lab 23  0734 23  0200 23  2354   PROBNP  --   --  27.0   HSTROP T <6 <6 <6         Lab 23  0200   CHOLESTEROL 148   LDL CHOL 73   HDL CHOL 61*   TRIGLYCERIDES 74             Brief Urine Lab Results     None          Microbiology Results Abnormal     None          Adult Transthoracic Echo Complete W/ Cont if Necessary Per  Protocol    Result Date: 4/3/2023  •  Left ventricular systolic function is normal. Calculated left ventricular EF = 66.3% Normal left ventricular cavity size and wall thickness noted. All left ventricular wall segments contract normally. Left ventricular diastolic function was normal. No evidence of a ventricular septal defect present. •  Normal right ventricular cavity size, wall thickness and systolic function noted. •  Normal right and left atrial size. •  Normal appearing valves without significant stenotic disease. Trace TR but insufficient envelope to assess RVSP. •  No pericardial effusion.     XR Chest 1 View    Result Date: 4/3/2023  EXAMINATION: XR CHEST 1 VW  DATE OF EXAM: 4/3/2023 12:37 AM HISTORY: Chest Pain Triage Protocol COMPARISON: None. FINDINGS: The lungs are clear. The cardiomediastinal silhouette, bones, soft tissues and upper abdomen are normal.     Impression: 1.  Normal chest Electronically signed by:  Ignacio Rivera M.D.  4/2/2023 10:42 PM Mountain Time    Stress Test With Pet Myocardial Perfusion    Result Date: 4/3/2023  •  No baseline ST changes and no changes with regadenason. Findings consistent with an indeterminate ECG stress test due to use of regadenason. •  GI artifact is present. •  Left ventricular ejection fraction is hyperdynamic (Calculated EF > 70%). •  There is no prior study available for comparison. Rest EF = 80% Stress EF =  87%. •  Myocardial perfusion imaging indicates a normal myocardial perfusion study with no evidence of ischemia. SRS: 0, SSS: 0, SDS:0, TID ratio 0.84 •  Impressions are consistent with a low risk study.       Results for orders placed during the hospital encounter of 04/03/23    Adult Transthoracic Echo Complete W/ Cont if Necessary Per Protocol    Interpretation Summary  •  Left ventricular systolic function is normal. Calculated left ventricular EF = 66.3% Normal left ventricular cavity size and wall thickness noted. All left ventricular wall  segments contract normally. Left ventricular diastolic function was normal. No evidence of a ventricular septal defect present.  •  Normal right ventricular cavity size, wall thickness and systolic function noted.  •  Normal right and left atrial size.  •  Normal appearing valves without significant stenotic disease. Trace TR but insufficient envelope to assess RVSP.  •  No pericardial effusion.      Current medications:  Scheduled Meds:atorvastatin, 80 mg, Oral, Nightly  carbidopa-levodopa CR, 1 tablet, Oral, Q12H  senna-docusate sodium, 2 tablet, Oral, BID  sodium chloride, 10 mL, Intravenous, Q12H      Continuous Infusions:lactated ringers, 75 mL/hr, Last Rate: 75 mL/hr (04/03/23 0956)      PRN Meds:.•  acetaminophen **OR** acetaminophen **OR** acetaminophen  •  senna-docusate sodium **AND** polyethylene glycol **AND** bisacodyl **AND** bisacodyl  •  ondansetron  •  sodium chloride  •  sodium chloride  •  sodium chloride    Assessment & Plan   Assessment & Plan     Active Hospital Problems    Diagnosis  POA   • **Chest pain, atypical [R07.89]  Yes   • Parkinson disease [G20]  Yes   • Mixed hyperlipidemia [E78.2]  Yes   • Family history of ischemic heart disease around age 50 in her brother [Z82.49]  Not Applicable   • Type 2 diabetes mellitus, without long-term current use of insulin [E11.9]  Yes   • Pre-syncope [R55]  Yes      Resolved Hospital Problems   No resolved problems to display.        Brief Hospital Course to date:  Kim Osorio is a 67 y.o. female with history of glaucoma, macular degeneration, recently diagnosed Parkinson's disease, prediabetes and recurrent presyncopal episodes (since November) presents with chest pain.    Chest pain  - HS troponin negative x 2  - EKG NSR with no significant ST changes or TWI  - CXR without infiltrate  - D dimer 0.32  - Stress testing reported as low risk for cardiac ischemia  - Echo EF 66%, no valvular disease noted    Presyncope  Orthostasis  - recently  started on midodrine by Neurology  - flexeril on [facility] rec -- would discontinue muscle relaxers as they may worsen orthostasis    Prediabetes  - a1c 6.1    Parkinson's disease  - follows with Neurology Dr Fischer  - some recent adustments to Sinemet dosing      Expected Discharge Location and Transportation: home  Expected Discharge   Expected Discharge Date and Time     Expected Discharge Date Expected Discharge Time    Apr 5, 2023            DVT prophylaxis:  Mechanical DVT prophylaxis orders are present.          CODE STATUS:   Code Status and Medical Interventions:   Ordered at: 04/03/23 0428     Level Of Support Discussed With:    Patient     Code Status (Patient has no pulse and is not breathing):    CPR (Attempt to Resuscitate)     Medical Interventions (Patient has pulse or is breathing):    Full Support       Sid Schreiber MD  04/03/23

## 2023-04-03 NOTE — Clinical Note
Level of Care: Telemetry [5]   Diagnosis: Chest pain, unspecified type [[id number]]   Admitting Physician: KASSANDRA INIGUEZ [[id number]]   Attending Physician: KASSANDRA INIGUEZ [[id number]]

## 2023-04-03 NOTE — CONSULTS
Inpatient Cardiology Consult  Consult performed by: Fe Mack APRN  Consult ordered by: Td Ibarra DO        [facility]  Cardiovascular Consultation Note    Reason for consult: Chest pain     Patient is a 67-year-old female with no prior cardiac history but history of glaucoma, hyperlipidemia, macular degeneration and Parkinson disease who presented to [facility] last evening with chest pain.  According to the patient around 3 AM on Sunday morning while sitting on her couch she developed chest pressure/upper epigastric discomfort that radiated up into her chest and into her jaws bilaterally.  She was able to eventually go to sleep but her symptoms returned that morning.  She had several more episodes.  She would also feel her heart beating forcefully in her ears and would feel nauseated.  On arrival to the emergency room she was treated with full-strength aspirin.  High-sensitivity troponins were negative x2 and EKG showed normal sinus rhythm with no acute ischemic changes.  A stress test was performed earlier today and was negative suggesting no ischemia.  Echocardiogram has been performed with results pending.  The patient was just diagnosed with Parkinson disease last month.  She has been having issues with dizziness and hypotension.  She reports having episodes of feeling lightheaded and weak like she could pass out but she is never actually passed out.  She has checked her blood pressures at home during these times and her systolic blood pressures have been as low as the 70s.  Because of her hypotension neurology recently put her on midodrine.  She actually has an upcoming appoint with Dr. Sprague at [facility] for her hypotension.  Of note on arrival here her blood pressure was 145/103.  She has not had midodrine since her admission.       Cardiac risk factors: Family history of coronary artery disease, hyperlipidemia, advanced age,  diet-controlled type 2 diabetes mellitus    Past medical and surgical history, social and family history reviewed in EMR.    REVIEW OF SYSTEMS:   H&P ROS reviewed and pertinent CV ROS as noted in HPI.         Vital Sign Min/Max for last 24 hours  Temp  Min: 97.8 °F (36.6 °C)  Max: 98.4 °F (36.9 °C)   BP  Min: 129/98  Max: 145/103   Pulse  Min: 72  Max: 102   Resp  Min: 16  Max: 18   SpO2  Min: 95 %  Max: 100 %   No data recorded      Intake/Output Summary (Last 24 hours) at 4/3/2023 1207  Last data filed at 4/3/2023 0956  Gross per 24 hour   Intake 312.5 ml   Output --   Net 312.5 ml           Constitutional:       Appearance: Healthy appearance. Well-developed.   Eyes:      General: Lids are normal. No scleral icterus.     Conjunctiva/sclera: Conjunctivae normal.   HENT:      Head: Normocephalic and atraumatic.   Neck:      Thyroid: No thyromegaly.      Vascular: No carotid bruit or JVD.   Pulmonary:      Effort: Pulmonary effort is normal.      Breath sounds: Normal breath sounds. No wheezing. No rhonchi. No rales.   Cardiovascular:      Normal rate. Regular rhythm.      Murmurs: There is no murmur.      No gallop. No rub.   Pulses:     Intact distal pulses.   Edema:     Peripheral edema absent.   Abdominal:      General: There is no distension.      Palpations: Abdomen is soft. There is no abdominal mass.   Musculoskeletal:      Cervical back: Normal range of motion. Skin:     General: Skin is warm and dry.      Findings: No rash.   Neurological:      General: No focal deficit present.      Mental Status: Alert and oriented to person, place, and time.      Gait: Gait is intact.   Psychiatric:         Attention and Perception: Attention normal.         Mood and Affect: Mood normal.         Behavior: Behavior normal.         EK/3/2023: Normal sinus rhythm.  No acute ischemic changes    Lab Review:   Labs reviewed in the electronic medical record.  Pertinent findings include:  Lab Results   Component Value Date     GLUCOSE 116 (H) 04/02/2023    BUN 14 04/02/2023    CREATININE 1.02 (H) 04/02/2023    EGFR 59.7 (L) 04/02/2023    BCR 13.7 04/02/2023    K 3.9 04/02/2023    CO2 24.0 04/02/2023    CALCIUM 9.8 04/02/2023    ALBUMIN 4.6 04/02/2023    BILITOT 0.2 04/02/2023    AST 18 04/02/2023    ALT 6 04/02/2023     Lab Results   Component Value Date    WBC 9.29 04/02/2023    HGB 13.4 04/02/2023    HCT 41.2 04/02/2023    MCV 89.6 04/02/2023     04/02/2023     Lab Results   Component Value Date    CHOL 148 04/03/2023    TRIG 74 04/03/2023    HDL 61 (H) 04/03/2023    LDL 73 04/03/2023     Lab Results   Component Value Date    TROPONINT <6 04/03/2023    TROPONINT <6 04/03/2023    TROPONINT <6 04/02/2023      Lab Results   Component Value Date    PROBNP 27.0 04/02/2023         Results from last 7 days   Lab Units 04/02/23  2354   HEMOGLOBIN A1C % 6.10*            Active Hospital Problems    Diagnosis    • **Chest pain, atypical      · Presentation to [facility]23 with chest pain  · High-sensitivity troponins negative x2 and EKG normal sinus rhythm without acute ST changes concerning for ischemia     • Mixed hyperlipidemia    • Family history of ischemic heart disease around age 50 in her brother    • Type 2 diabetes mellitus, without long-term current use of insulin      · Diet-controlled diabetes with hemoglobin A1c 6.1     • Pre-syncope      · Was placed on midodrine for presumed orthostatic hypotension     • Parkinson disease             · Patient has negative cardiac work-up undetectable high-sensitivity troponin, ECG without ischemic changes, and stress test that does not suggest any infarct or ischemia  · Echocardiogram is normal with normal EF and structurally normal valves.  · For her orthostasis I discussed with the patient and her family member to continue liberalizing salt intake and maintain good hydration.  Recommended trial of compression stockings.  · She did have vital sign evidence of  orthostasis today without significant symptoms.  We discussed that the etiology may be secondary to her parkinsonism or even the carbidopa/levodopa.  Recommend continuation of midodrine for now and consideration of trial of fludrocortisone as an outpatient if her symptoms persist despite behavioral modifications.  · Cardiology will see as needed, please call with questions  · [facility].          VERO Velasquez MD  04/03/23 15:47 EDT

## 2023-04-03 NOTE — CASE MANAGEMENT/SOCIAL WORK
Discharge Planning Assessment  [facility]     Patient Name: Kim Osorio  MRN: [id number]  Today's Date: 4/3/2023    Admit Date: 4/3/2023    Plan: discharge plan   Discharge Needs Assessment     Row Name 04/03/23 1250       Living Environment    People in Home alone    Current Living Arrangements home    Primary Care Provided by self    Provides Primary Care For no one    Family Caregiver if Needed child(aga), adult    Family Caregiver Names Radha Dunn(daughter)    Quality of Family Relationships helpful;involved;supportive    Able to Return to Prior Arrangements yes    Living Arrangement Comments I met with pt and pt's daughterRadha in room with permission.  Pt resides in [place] [facility] in a home alone..       Transition Planning    Patient/Family Anticipates Transition to home    Patient/Family Anticipated Services at Transition     Transportation Anticipated family or friend will provide;car, drives self       Discharge Needs Assessment    Readmission Within the Last 30 Days no previous admission in last 30 days    Equipment Currently Used at Home grab bar    Concerns to be Addressed discharge planning    Equipment Needed After Discharge grab bar, tub/shower    Discharge Coordination/Progress Pt confirms that she has [employer] Medicare Replacement insurance with prescription coverage and uses [facility] in [place]. Pt reports she is independent with ADLs and drives. Pt has history of hyperlipidema, recent Parkisons and here with c/os of chest pain. Cardiology consulted. Pt's goal is home and currently denies discharge needs. Her daughter, Radha confirms that she can transport her mother home at discharge and will be available to check in on her. CM will cont to follow               Discharge Plan     Row Name 04/03/23 2938       Plan    Plan discharge plan    Plan Comments Pt's goal is home and currently denies discharge needs. Her daughter, Radha confirms that she can  transport her mother home at discharge and will be available to check in on her. CM will cont to follow    Final Discharge Disposition Code 01 - home or self-care              Continued Care and Services - Admitted Since 4/3/2023    Coordination has not been started for this encounter.       Expected Discharge Date and Time     Expected Discharge Date Expected Discharge Time    Apr 5, 2023          Demographic Summary     Row Name 04/03/23 1248       General Information    General Information Comments PCP is SHERLYN DEUTSCH       Contact Information    Permission Granted to Share Info With     Contact Information Obtained for     Contact Information Comments Radha Dunn(daughter) [phone]               Functional Status     Row Name 04/03/23 1249       Functional Status    Functional Status Comments Pt reports she is independent with ADLs               Psychosocial    No documentation.                Abuse/Neglect    No documentation.                Legal    No documentation.                Substance Abuse    No documentation.                Patient Forms    No documentation.                   Ayana Concepcion RN

## 2023-04-04 VITALS
TEMPERATURE: 97.8 F | HEART RATE: 99 BPM | RESPIRATION RATE: 18 BRPM | BODY MASS INDEX: 23.49 KG/M2 | SYSTOLIC BLOOD PRESSURE: 88 MMHG | HEIGHT: 64 IN | OXYGEN SATURATION: 95 % | WEIGHT: 137.6 LBS | DIASTOLIC BLOOD PRESSURE: 49 MMHG

## 2023-04-04 LAB
ANION GAP SERPL CALCULATED.3IONS-SCNC: 13 MMOL/L (ref 5–15)
BASOPHILS # BLD AUTO: 0.03 10*3/MM3 (ref 0–0.2)
BASOPHILS NFR BLD AUTO: 0.5 % (ref 0–1.5)
BUN SERPL-MCNC: 10 MG/DL (ref 8–23)
BUN/CREAT SERPL: 12.8 (ref 7–25)
CALCIUM SPEC-SCNC: 9.6 MG/DL (ref 8.6–10.5)
CHLORIDE SERPL-SCNC: 104 MMOL/L (ref 98–107)
CO2 SERPL-SCNC: 23 MMOL/L (ref 22–29)
CREAT SERPL-MCNC: 0.78 MG/DL (ref 0.57–1)
DEPRECATED RDW RBC AUTO: 45.7 FL (ref 37–54)
EGFRCR SERPLBLD CKD-EPI 2021: 82.3 ML/MIN/1.73
EOSINOPHIL # BLD AUTO: 0.05 10*3/MM3 (ref 0–0.4)
EOSINOPHIL NFR BLD AUTO: 0.8 % (ref 0.3–6.2)
ERYTHROCYTE [DISTWIDTH] IN BLOOD BY AUTOMATED COUNT: 13.9 % (ref 12.3–15.4)
GLUCOSE BLDC GLUCOMTR-MCNC: 85 MG/DL (ref 70–130)
GLUCOSE BLDC GLUCOMTR-MCNC: 88 MG/DL (ref 70–130)
GLUCOSE SERPL-MCNC: 86 MG/DL (ref 65–99)
HCT VFR BLD AUTO: 42 % (ref 34–46.6)
HGB BLD-MCNC: 13.7 G/DL (ref 12–15.9)
IMM GRANULOCYTES # BLD AUTO: 0.01 10*3/MM3 (ref 0–0.05)
IMM GRANULOCYTES NFR BLD AUTO: 0.2 % (ref 0–0.5)
LYMPHOCYTES # BLD AUTO: 2.37 10*3/MM3 (ref 0.7–3.1)
LYMPHOCYTES NFR BLD AUTO: 39.3 % (ref 19.6–45.3)
MCH RBC QN AUTO: 29.2 PG (ref 26.6–33)
MCHC RBC AUTO-ENTMCNC: 32.6 G/DL (ref 31.5–35.7)
MCV RBC AUTO: 89.6 FL (ref 79–97)
MONOCYTES # BLD AUTO: 0.64 10*3/MM3 (ref 0.1–0.9)
MONOCYTES NFR BLD AUTO: 10.6 % (ref 5–12)
NEUTROPHILS NFR BLD AUTO: 2.93 10*3/MM3 (ref 1.7–7)
NEUTROPHILS NFR BLD AUTO: 48.6 % (ref 42.7–76)
NRBC BLD AUTO-RTO: 0 /100 WBC (ref 0–0.2)
PLATELET # BLD AUTO: 237 10*3/MM3 (ref 140–450)
PMV BLD AUTO: 9.5 FL (ref 6–12)
POTASSIUM SERPL-SCNC: 4.1 MMOL/L (ref 3.5–5.2)
RBC # BLD AUTO: 4.69 10*6/MM3 (ref 3.77–5.28)
SODIUM SERPL-SCNC: 140 MMOL/L (ref 136–145)
WBC NRBC COR # BLD: 6.03 10*3/MM3 (ref 3.4–10.8)

## 2023-04-04 PROCEDURE — G0378 HOSPITAL OBSERVATION PER HR: HCPCS

## 2023-04-04 PROCEDURE — 82962 GLUCOSE BLOOD TEST: CPT

## 2023-04-04 PROCEDURE — 99239 HOSP IP/OBS DSCHRG MGMT >30: CPT | Performed by: INTERNAL MEDICINE

## 2023-04-04 PROCEDURE — 85025 COMPLETE CBC W/AUTO DIFF WBC: CPT | Performed by: INTERNAL MEDICINE

## 2023-04-04 PROCEDURE — 80048 BASIC METABOLIC PNL TOTAL CA: CPT | Performed by: INTERNAL MEDICINE

## 2023-04-04 PROCEDURE — 97161 PT EVAL LOW COMPLEX 20 MIN: CPT

## 2023-04-04 RX ORDER — MIDODRINE HYDROCHLORIDE 5 MG/1
5 TABLET ORAL
Status: DISCONTINUED | OUTPATIENT
Start: 2023-04-04 | End: 2023-04-04 | Stop reason: HOSPADM

## 2023-04-04 RX ADMIN — Medication 10 ML: at 08:34

## 2023-04-04 RX ADMIN — CARBIDOPA AND LEVODOPA 1 TABLET: 50; 200 TABLET, EXTENDED RELEASE ORAL at 08:33

## 2023-04-04 RX ADMIN — SENNOSIDES AND DOCUSATE SODIUM 2 TABLET: 50; 8.6 TABLET ORAL at 08:33

## 2023-04-04 RX ADMIN — MIDODRINE HYDROCHLORIDE 5 MG: 5 TABLET ORAL at 08:33

## 2023-04-04 NOTE — PROGRESS NOTES
"                    Clinical Nutrition       Patient Name: Kim Osorio  YOB: 1955  MRN: [id number]  Date of Encounter: 04/04/23 09:24 EDT  Admission date: 4/3/2023      Reason for Visit   MST score 2+, Unintended wt loss, Reduced oral intake    EMR Reviewed     EMR Reviewed: yes     Admission Diagnosis:  Chest pain, unspecified type [R07.9]  Chest pain, unspecified type [R07.9]  Chest pain, unspecified type [R07.9]    Problem List:    Chest pain, atypical    Parkinson disease    Mixed hyperlipidemia    Family history of ischemic heart disease around age 50 in her brother    Type 2 diabetes mellitus, without long-term current use of insulin    Pre-syncope      Anthropometric      Height: 162.6 cm (64\")  Last Filed Weight: Weight: 62.4 kg (137 lb 9.6 oz) (04/03/23 0509)  Weight Method: Standing scale  BMI: BMI (Calculated): 23.6  BMI classification: Normal: 18.5-24.9kg/m2   IBW:  120 lb    UBW: ~140 lb per pt, no previous wt data avail in EMR  Weight change: none     Reported/Observed/Food/Nutrition Related - Comments     Pt screened for MST reports of unintentional weight loss and reduced oral intake. Visited with pt at bedside, eating breakfast and tells me she is going home today. Denies any changes to weight or intakes/appetite. No nutritional concerns or questions.      Current Nutrition Prescription     Diet: Cardiac Diets, Diabetic Diets; Healthy Heart (2-3 Na+); Consistent Carbohydrate; Texture: Regular Texture (IDDSI 7); Fluid Consistency: Thin (IDDSI 0)  No active supplement orders      Average Intake from Charting: Insufficient data     Nutrition Diagnosis     Problem No nutrition diagnosis at this time   Etiology    Signs/Symptoms    Status:    Actions     Follow treatment progress, Care plan reviewed, Menu provided    Monitor Per Protocol      Etelvina Quezada RD  Time Spent: 20m        "

## 2023-04-04 NOTE — THERAPY DISCHARGE NOTE
Patient Name: Kim Osorio  : 1955    MRN: [id number]                              Today's Date: 2023       Admit Date: 4/3/2023    Visit Dx:     ICD-10-CM ICD-9-CM   1. Chest pain, unspecified type  R07.9 786.50   2. Angina at rest  I20.8 413.9     Patient Active Problem List   Diagnosis   • Chest pain, atypical   • Parkinson disease   • Mixed hyperlipidemia   • Family history of ischemic heart disease around age 50 in her brother   • Type 2 diabetes mellitus, without long-term current use of insulin   • Pre-syncope     Past Medical History:   Diagnosis Date   • Family history of early CAD    • Glaucoma    • HLD (hyperlipidemia)    • Macular degeneration    • Parkinson disease      Past Surgical History:   Procedure Laterality Date   • BREAST BIOPSY     • GUM SURGERY     • POLYPECTOMY     • WISDOM TOOTH EXTRACTION        General Information     Row Name 23 0950          Physical Therapy Time and Intention    Document Type discharge evaluation/summary  -NS     Mode of Treatment individual therapy;physical therapy  -NS     Row Name 23 0950          General Information    Patient Profile Reviewed yes  -NS     Prior Level of Function independent:;community mobility;gait;transfer;bed mobility;ADL's  -NS     Existing Precautions/Restrictions orthostatic hypotension  -NS     Barriers to Rehab none identified  -NS     Row Name 23 0950          Living Environment    People in Home alone  -NS     Row Name 23 0950          Home Main Entrance    Number of Stairs, Main Entrance none  -NS     Row Name 23 0950          Stairs Within Home, Primary    Number of Stairs, Within Home, Primary none  -NS     Row Name 23 0950          Cognition    Orientation Status (Cognition) oriented x 3  -NS           User Key  (r) = Recorded By, (t) = Taken By, (c) = Cosigned By    Initials Name Provider Type    Sonya Tee PT Physical Therapist               Mobility     Row Name  04/04/23 0950          Bed Mobility    Comment, (Bed Mobility) Pt UIC  -NS     Row Name 04/04/23 0950          Transfers    Comment, (Transfers) appropriate safety awareness  -NS     [facility] Name 04/04/23 0950          Sit-Stand Transfer    Sit-Stand [place] (Transfers) independent  -NS     Row Name 04/04/23 0950          Gait/Stairs (Locomotion)    [place] Level (Gait) independent  -NS     Distance in Feet (Gait) 200  -NS     Deviations/Abnormal Patterns (Gait) odalis decreased;gait speed decreased  -NS     Bilateral Gait Deviations heel strike decreased  -NS     Comment, (Gait/Stairs) Patient ambulated at slightly decreased gait speed, demonstrating appropriate safety awareness and balance. No LOB with forwards gait, turns, marching, or backwards steps.  -NS           User Key  (r) = Recorded By, (t) = Taken By, (c) = Cosigned By    Initials Name Provider Type    Sonya Tee PT Physical Therapist               Obj/Interventions     [facility] Name 04/04/23 0950          Range of Motion Comprehensive    General Range of Motion bilateral lower extremity ROM WFL  -NS     Row Name 04/04/23 0950          Strength Comprehensive (MMT)    General Manual Muscle Testing (MMT) Assessment no strength deficits identified  -NS     Row Name 04/04/23 0950          Balance    Balance Assessment sitting static balance;sitting dynamic balance;standing static balance;standing dynamic balance  -NS     Static Sitting Balance independent  -NS     Dynamic Sitting Balance independent  -NS     Position, Sitting Balance unsupported;sitting in chair  -NS     Static Standing Balance independent  -NS     Dynamic Standing Balance independent  -NS     Position/Device Used, Standing Balance unsupported  -NS     Row Name 04/04/23 0950          Sensory Assessment (Somatosensory)    Sensory Assessment (Somatosensory) LE sensation intact  -NS           User Key  (r) = Recorded By, (t) = Taken By, (c) = Cosigned By    Initials Name Provider Type     Sonya Tee, PT Physical Therapist               Goals/Plan    No documentation.                Clinical Impression     Row Name 04/04/23 0950          Pain    Pretreatment Pain Rating 3/10  -NS     Posttreatment Pain Rating 2/10  -NS     Pain Location - Side/Orientation Bilateral  -NS     Pain Location anterior  -NS     Pain Location - abdomen  -NS     Pre/Posttreatment Pain Comment abdominal pain related to indigestion, states it is improving and RN provided medication  -NS     Pain Intervention(s) Repositioned  -NS     Row Name 04/04/23 0950          Plan of Care Review    Plan of Care Reviewed With patient;daughter  -NS     Progress no change  -NS     Outcome Evaluation Patient presents with appropriate balance and safety awareness, very mild functional weakness that pt attributes to recent Parkinson's diagnosis. She ambulated without AD without difficulty. Skilled IP PT services not warranted at this time. Recommend home with assist prn from daughter and OP PT if desired at d/c.  -NS     Row Name 04/04/23 0950          Therapy Assessment/Plan (PT)    Criteria for Skilled Interventions Met (PT) no;no problems identified which require skilled intervention  -NS     Therapy Frequency (PT) evaluation only  -NS     [facility] Name 04/04/23 0950          Vital Signs    Pre Systolic BP Rehab --  Not on telemetry anymore due to upcoming discharge. No reports of dizziness or light headedness.  -NS     Pre Patient Position Sitting  -NS     Intra Patient Position Standing  -NS     Post Patient Position Sitting  -NS     [facility] Name 04/04/23 0950          Positioning and Restraints    Pre-Treatment Position sitting in chair/recliner  -NS     Post Treatment Position chair  -NS     In Chair notified nsg;sitting;call light within reach;encouraged to call for assist;with family/caregiver  -NS           User Key  (r) = Recorded By, (t) = Taken By, (c) = Cosigned By    Initials Name Provider Type    Sonya Tee, PT Physical  Therapist               Outcome Measures     Row Name 04/04/23 0950          How much help from another person do you currently need...    Turning from your back to your side while in flat bed without using bedrails? 4  -NS     Moving from lying on back to sitting on the side of a flat bed without bedrails? 4  -NS     Moving to and from a bed to a chair (including a wheelchair)? 4  -NS     Standing up from a chair using your arms (e.g., wheelchair, bedside chair)? 4  -NS     Climbing 3-5 steps with a railing? 4  -NS     To walk in hospital room? 4  -NS     AM-PAC 6 Clicks Score (PT) 24  -NS     Highest level of mobility 8 --> Walked 250 feet or more  -NS     Row Name 04/04/23 0950          Functional Assessment    Outcome Measure Options AM-PAC 6 Clicks Basic Mobility (PT)  -NS           User Key  (r) = Recorded By, (t) = Taken By, (c) = Cosigned By    Initials Name Provider Type    Sonya Tee PT Physical Therapist              Physical Therapy Education     Title: PT OT SLP Therapies (Done)     Topic: Physical Therapy (Done)     Point: Mobility training (Done)     Learning Progress Summary           Patient Acceptance, E, VU by NS at 4/4/2023 1016    Comment: Pt educated about OP PT services at discharge to continued strengthening.                   Point: Body mechanics (Done)     Learning Progress Summary           Patient Acceptance, E, VU by NS at 4/4/2023 1016    Comment: Pt educated about OP PT services at discharge to continued strengthening.                   Point: Precautions (Done)     Learning Progress Summary           Patient Acceptance, E, VU by NS at 4/4/2023 1016    Comment: Pt educated about OP PT services at discharge to continued strengthening.                               User Key     Initials Effective Dates Name Provider Type Discipline    NS 06/16/21 -  Sonya Fernandez PT Physical Therapist PT              PT Recommendation and Plan     Plan of Care Reviewed With: patient,  daughter  Progress: no change  Outcome Evaluation: Patient presents with appropriate balance and safety awareness, very mild functional weakness that pt attributes to recent Parkinson's diagnosis. She ambulated without AD without difficulty. Skilled IP PT services not warranted at this time. Recommend home with assist prn from daughter and OP PT if desired at d/c.     Time Calculation:    PT Charges     Row Name 04/04/23 0950             Time Calculation    Start Time 0950  -NS      PT Received On 04/04/23  -NS         Untimed Charges    PT Eval/Re-eval Minutes 33  -NS         Total Minutes    Untimed Charges Total Minutes 33  -NS       Total Minutes 33  -NS            User Key  (r) = Recorded By, (t) = Taken By, (c) = Cosigned By    Initials Name Provider Type    Sonya Tee, PT Physical Therapist              Therapy Charges for Today     Code Description Service Date Service Provider Modifiers Qty    12409927652 HC PT EVAL LOW COMPLEXITY 3 4/4/2023 Sonya Fernandez, PT GP 1          PT G-Codes  Outcome Measure Options: AM-PAC 6 Clicks Basic Mobility (PT)  AM-PAC 6 Clicks Score (PT): 24    PT Discharge Summary  Anticipated Discharge Disposition (PT): home with assist  Reason for Discharge: Independent    Sonya Fernandez PT  4/4/2023

## 2023-04-04 NOTE — DISCHARGE SUMMARY
[facility] Medicine Services  DISCHARGE SUMMARY    Patient Name: Kim Osorio  : 1955  MRN: [id number]    Date of Admission: 4/3/2023  3:24 AM  Date of Discharge:  23  Primary Care Physician: Josefa Lerma MD    Consults     Date and Time Order Name Status Description    4/3/2023  4:17 AM Inpatient Cardiology Consult Completed           Hospital Course     Presenting Problem:   Chest pain, unspecified type [R07.9]  Chest pain, unspecified type [R07.9]  Chest pain, unspecified type [R07.9]    Active Hospital Problems    Diagnosis  POA   • **Chest pain, atypical [R07.89]  Yes   • Parkinson disease [G20]  Yes   • Mixed hyperlipidemia [E78.2]  Yes   • Family history of ischemic heart disease around age 50 in her brother [Z82.49]  Not Applicable   • Type 2 diabetes mellitus, without long-term current use of insulin [E11.9]  Yes   • Pre-syncope [R55]  Yes      Resolved Hospital Problems   No resolved problems to display.          Hospital Course:  Kim Osorio is a 67 y.o. female with history of glaucoma, macular degeneration, recently diagnosed Parkinson's disease, prediabetes and recurrent presyncopal episodes (since November) presents with chest pain.     Chest pain  - HS troponin negative x 2  - EKG NSR with no significant ST changes or TWI  - CXR without infiltrate  - D dimer 0.32  - Stress testing reported as low risk for cardiac ischemia  - Echo EF 66%, no valvular disease noted     Presyncope  Orthostasis  - recently started on midodrine by Neurology  - patient seen by Cardiology -- recommended to continue midodrine and consider addition of fludrocortisone should symptoms continue.    - liberalize salt and fluid intake  - advised patient to practice precautions on standing up (sit at edge of bed for at least one minute before standing, to then stand slowly and to have a plan for sitting back down should she become lightheaded.  Similar precautions  with toileting.   - follow up appointments already scheduled with both Neurology next week and Cardiology the following week.     Prediabetes  - a1c 6.1     Parkinson's disease  - follows with Neurology Dr Fischer  - some recent adustments to Sinemet dosing, follow up with Dr Fischer as scheduled      Discharge Follow Up Recommendations for outpatient labs/diagnostics:      Day of Discharge     HPI:   Feels OK, BP dropped on orthostatic testing today, but did not become lightheaded when standing.  Has been walking about the room.  Headache and leg cramps last night.  Reluctant to try magnesium supplements for concern they may lower her blood pressure.    Review of Systems  Gen: no fever  CV: no chest pain    Vital Signs:   Temp:  [97.8 °F (36.6 °C)-98.2 °F (36.8 °C)] 97.8 °F (36.6 °C)  Heart Rate:  [65-99] 99  Resp:  [18] 18  BP: ()/(49-85) 88/49      Physical Exam:  Constitutional - no acute distress, nontoxic, up in chair  HEENT-NCAT, mucous membranes moist  CV-RRR  Resp-CTAB  Abd-soft, nontender, nondistended, normoactive bowel sounds  Ext-No lower extremity cyanosis, clubbing or edema bilaterally  Neuro-alert, speech clear, moves all extremities   Psych-normal affect   Skin- No rash on exposed UE or LE bilaterally      Pertinent  and/or Most Recent Results     LAB RESULTS:      Lab 04/04/23 0417 04/02/23  2354   WBC 6.03 9.29   HEMOGLOBIN 13.7 13.4   HEMATOCRIT 42.0 41.2   PLATELETS 237 259   NEUTROS ABS 2.93 5.71   IMMATURE GRANS (ABS) 0.01 0.03   LYMPHS ABS 2.37 2.63   MONOS ABS 0.64 0.83   EOS ABS 0.05 0.03   MCV 89.6 89.6   D DIMER QUANT  --  0.32         Lab 04/04/23  0417 04/03/23  0200 04/02/23  2354   SODIUM 140  --  141   POTASSIUM 4.1  --  3.9   CHLORIDE 104  --  105   CO2 23.0  --  24.0   ANION GAP 13.0  --  12.0   BUN 10  --  14   CREATININE 0.78  --  1.02*   EGFR 82.3  --  59.7*   GLUCOSE 86  --  116*   CALCIUM 9.6  --  9.8   HEMOGLOBIN A1C  --   --  6.10*   TSH  --  2.440  --          Lab  04/02/23  2354   TOTAL PROTEIN 7.3   ALBUMIN 4.6   GLOBULIN 2.7   ALT (SGPT) 6   AST (SGOT) 18   BILIRUBIN 0.2   ALK PHOS 49   LIPASE 51         Lab 04/03/23  0734 04/03/23  0200 04/02/23  2354   PROBNP  --   --  27.0   HSTROP T <6 <6 <6         Lab 04/03/23  0200   CHOLESTEROL 148   LDL CHOL 73   HDL CHOL 61*   TRIGLYCERIDES 74             Brief Urine Lab Results     None        Microbiology Results (last 10 days)     ** No results found for the last 240 hours. **          Adult Transthoracic Echo Complete W/ Cont if Necessary Per Protocol    Result Date: 4/3/2023  •  Left ventricular systolic function is normal. Calculated left ventricular EF = 66.3% Normal left ventricular cavity size and wall thickness noted. All left ventricular wall segments contract normally. Left ventricular diastolic function was normal. No evidence of a ventricular septal defect present. •  Normal right ventricular cavity size, wall thickness and systolic function noted. •  Normal right and left atrial size. •  Normal appearing valves without significant stenotic disease. Trace TR but insufficient envelope to assess RVSP. •  No pericardial effusion.     XR Chest 1 View    Result Date: 4/3/2023  EXAMINATION: XR CHEST 1 VW  DATE OF EXAM: 4/3/2023 12:37 AM HISTORY: Chest Pain Triage Protocol COMPARISON: None. FINDINGS: The lungs are clear. The cardiomediastinal silhouette, bones, soft tissues and upper abdomen are normal.     1.  Normal chest Electronically signed by:  Ignacio Rivera M.D.  4/2/2023 10:42 PM Mountain Time    Stress Test With Pet Myocardial Perfusion    Result Date: 4/3/2023  •  No baseline ST changes and no changes with regadenason. Findings consistent with an indeterminate ECG stress test due to use of regadenason. •  GI artifact is present. •  Left ventricular ejection fraction is hyperdynamic (Calculated EF > 70%). •  There is no prior study available for comparison. Rest EF = 80% Stress EF =  87%. •  Myocardial  perfusion imaging indicates a normal myocardial perfusion study with no evidence of ischemia. SRS: 0, SSS: 0, SDS:0, TID ratio 0.84 •  Impressions are consistent with a low risk study.               Results for orders placed during the hospital encounter of 04/03/23    Adult Transthoracic Echo Complete W/ Cont if Necessary Per Protocol    Interpretation Summary  •  Left ventricular systolic function is normal. Calculated left ventricular EF = 66.3% Normal left ventricular cavity size and wall thickness noted. All left ventricular wall segments contract normally. Left ventricular diastolic function was normal. No evidence of a ventricular septal defect present.  •  Normal right ventricular cavity size, wall thickness and systolic function noted.  •  Normal right and left atrial size.  •  Normal appearing valves without significant stenotic disease. Trace TR but insufficient envelope to assess RVSP.  •  No pericardial effusion.      Plan for Follow-up of Pending Labs/Results:     Discharge Details        Discharge Medications      Continue These Medications      Instructions Start Date   CARBIDOPA-LEVODOPA ER PO    mg, Oral, 2 Times Daily      midodrine 5 MG tablet  Commonly known as: PROAMATINE   10 mg, Oral, 3 Times Daily      multivitamin with minerals tablet tablet   1 tablet, Oral, Daily      Ocuvite Extra tablet tablet   1 tablet, Oral, Daily      simvastatin 20 MG tablet  Commonly known as: ZOCOR   20 mg, Oral, Nightly      sodium chloride 1 g tablet   1 g, Oral, Daily             No Known Allergies      Discharge Disposition:  Home or Self Care    Diet:  Hospital:  Diet Order   Procedures   • Diet: Cardiac Diets, Diabetic Diets; Healthy Heart (2-3 Na+); Consistent Carbohydrate; Texture: Regular Texture (IDDSI 7); Fluid Consistency: Thin (IDDSI 0)       Activity:      Restrictions or Other Recommendations:         CODE STATUS:    Code Status and Medical Interventions:   Ordered at: 04/03/23 0428     Level  Of Support Discussed With:    Patient     Code Status (Patient has no pulse and is not breathing):    CPR (Attempt to Resuscitate)     Medical Interventions (Patient has pulse or is breathing):    Full Support       No future appointments.    Additional Instructions for the Follow-ups that You Need to Schedule     Discharge Follow-up with Specialty: follow up with [facility] as scheduled   As directed      Specialty: follow up with [facility]iology as scheduled         Discharge Follow-up with Specialty: follow up with Neurology as scheduled   As directed      Specialty: follow up with Neurology as scheduled                     Sid Schreiber MD  04/04/23      Time Spent on Discharge:  I spent  35  minutes on this discharge activity which included: face-to-face encounter with the patient, reviewing the data in the system, coordination of the care with the nursing staff as well as consultants, documentation, and entering orders.

## 2023-04-04 NOTE — CASE MANAGEMENT/SOCIAL WORK
Case Management Discharge Note      Final Note: Pt is being discharged home today and family will transport. Pt denies discharge needs.         Selected Continued Care - Discharged on 4/4/2023 Admission date: 4/3/2023 - Discharge disposition: Home or Self Care    Destination    No services have been selected for the patient.              Durable Medical Equipment    No services have been selected for the patient.              Dialysis/Infusion    No services have been selected for the patient.              Home Medical Care    No services have been selected for the patient.              Therapy    No services have been selected for the patient.              Community Resources    No services have been selected for the patient.              Community & DME    No services have been selected for the patient.                       Final Discharge Disposition Code: 01 - home or self-care

## 2023-04-04 NOTE — PLAN OF CARE
Goal Outcome Evaluation:  Plan of Care Reviewed With: patient, daughter        Progress: no change  Outcome Evaluation: Patient presents with appropriate balance and safety awareness, very mild functional weakness that pt attributes to recent Parkinson's diagnosis. She ambulated without AD without difficulty. Skilled IP PT services not warranted at this time. Recommend home with assist prn from daughter and OP PT if desired at d/c.

## 2023-04-04 NOTE — NURSING NOTE
VSS. Pt denied SOA, and pain. Daughter at the bedside all night. Orthostatics this morning as follow:     Supine   - 128/28   Sitting     -  97/73    Standing - 88/49

## 2024-01-25 ENCOUNTER — TRANSCRIBE ORDERS (OUTPATIENT)
Dept: NUTRITION | Facility: HOSPITAL | Age: 69
End: 2024-01-25
Payer: MEDICARE

## 2024-01-25 DIAGNOSIS — R63.4 WEIGHT LOSS: Primary | ICD-10-CM
